# Patient Record
Sex: FEMALE | Race: WHITE | NOT HISPANIC OR LATINO | URBAN - METROPOLITAN AREA
[De-identification: names, ages, dates, MRNs, and addresses within clinical notes are randomized per-mention and may not be internally consistent; named-entity substitution may affect disease eponyms.]

---

## 2024-06-08 ENCOUNTER — HOSPITAL ENCOUNTER (EMERGENCY)
Facility: HOSPITAL | Age: 64
Discharge: HOME | End: 2024-06-08
Payer: MEDICARE

## 2024-06-08 VITALS
OXYGEN SATURATION: 94 % | TEMPERATURE: 98.2 F | SYSTOLIC BLOOD PRESSURE: 130 MMHG | WEIGHT: 190 LBS | RESPIRATION RATE: 16 BRPM | HEART RATE: 92 BPM | BODY MASS INDEX: 30.53 KG/M2 | HEIGHT: 66 IN | DIASTOLIC BLOOD PRESSURE: 87 MMHG

## 2024-06-08 DIAGNOSIS — S09.90XA CLOSED HEAD INJURY, INITIAL ENCOUNTER: Primary | ICD-10-CM

## 2024-06-08 PROCEDURE — 99281 EMR DPT VST MAYX REQ PHY/QHP: CPT

## 2024-06-08 ASSESSMENT — PAIN SCALES - GENERAL: PAINLEVEL_OUTOF10: 3

## 2024-06-08 ASSESSMENT — PAIN - FUNCTIONAL ASSESSMENT: PAIN_FUNCTIONAL_ASSESSMENT: 0-10

## 2024-06-08 ASSESSMENT — PAIN DESCRIPTION - LOCATION: LOCATION: HEAD

## 2024-06-08 ASSESSMENT — PAIN DESCRIPTION - ORIENTATION: ORIENTATION: LEFT

## 2024-06-08 ASSESSMENT — PAIN DESCRIPTION - DESCRIPTORS: DESCRIPTORS: SORE

## 2024-06-08 ASSESSMENT — COLUMBIA-SUICIDE SEVERITY RATING SCALE - C-SSRS
6. HAVE YOU EVER DONE ANYTHING, STARTED TO DO ANYTHING, OR PREPARED TO DO ANYTHING TO END YOUR LIFE?: NO
2. HAVE YOU ACTUALLY HAD ANY THOUGHTS OF KILLING YOURSELF?: NO
1. IN THE PAST MONTH, HAVE YOU WISHED YOU WERE DEAD OR WISHED YOU COULD GO TO SLEEP AND NOT WAKE UP?: NO

## 2024-06-08 ASSESSMENT — LIFESTYLE VARIABLES
EVER FELT BAD OR GUILTY ABOUT YOUR DRINKING: NO
EVER HAD A DRINK FIRST THING IN THE MORNING TO STEADY YOUR NERVES TO GET RID OF A HANGOVER: NO
TOTAL SCORE: 0
HAVE PEOPLE ANNOYED YOU BY CRITICIZING YOUR DRINKING: NO
HAVE YOU EVER FELT YOU SHOULD CUT DOWN ON YOUR DRINKING: NO

## 2024-06-08 ASSESSMENT — PAIN DESCRIPTION - PAIN TYPE: TYPE: ACUTE PAIN

## 2024-06-08 NOTE — ED PROVIDER NOTES
HPI   Chief Complaint   Patient presents with    Head Injury    Leg Pain       Patient is a 63-year-old female presenting via EMS with a head injury.  Per patient, she was in the [facility] waiting room accidentally hit her head on the door frame.  States that she wanted to be evaluated for possible subdural hematoma.  While she was being triaged, she became very irate and causing a ruckus.  I went and personally evaluate this patient and she appeared very frantic and states she would like to leave.  Patient states she is not taking blood thinners at this time.  Patient denies fevers, chills, cough, sore throat, runny nose, chest pain, shortness of breath, abdominal pain, nausea, vomiting, diarrhea or urinary complaints.                          [place] Coma Scale Score: 15                     Patient History   Past Medical History:   Diagnosis Date    Personal history of other infectious and parasitic diseases 11/25/2015    History of hepatitis C virus infection     No past surgical history on file.  No family history on file.  Social History     Tobacco Use    Smoking status: Not on file    Smokeless tobacco: Not on file   Substance Use Topics    Alcohol use: Not on file    Drug use: Not on file       Physical Exam   ED Triage Vitals [06/08/24 1547]   Temperature Heart Rate Respirations BP   36.8 °C (98.2 °F) (!) 105 18 130/87      Pulse Ox Temp Source Heart Rate Source Patient Position   94 % Temporal Monitor Sitting      BP Location FiO2 (%)     Right arm --       Physical Exam  Constitutional:       Comments: Awake, frantic appearing, pacing about the triage room   HENT:      Head:      Comments: Patient unwilling to let me evaluate her     Nose:      Comments: Patient unwilling to let me evaluate her     Mouth/Throat:      Comments: Patient unwilling to let me evaluate her  Eyes:      Comments: Patient unwilling to let me evaluate her   Neck:      Comments: Patient unwilling to let me evaluate  "her  Cardiovascular:      Comments: Patient unwilling to let me evaluate her  Pulmonary:      Comments: Patient unwilling to let me evaluate her.  Patient speaking in full sentences  Abdominal:      Comments: Patient unwilling to let me evaluate her   Musculoskeletal:      Comments: Patient unwilling to let me evaluate her.  Patient actively pacing about the ER triage room   Skin:     Comments: Patient unwilling to let me evaluate her   Neurological:      General: No focal deficit present.      Comments: Patient unwilling to let me evaluate her   Psychiatric:      Comments: cecilia Nicole, flight of ideas         ED Course & MDM   Diagnoses as of 06/08/24 1616   Closed head injury, initial encounter       Medical Decision Making  Patient is a 63-year-old female presenting via EMS with a head injury.  Patient was adamant that I did not step across the line into the triage room as \"that will cost me money\".  I urged her that since she was here, I would like to do an exam on her and we can get a CT scan.  States that she is adamantly denying CAT scan because she again does not want to be evaluated.  She just wants her son to come and pick her up.  Security was present in case patient became more irate.    Patient subsequently made a phone call and then has since left the emergency department.  Patient has left with treatment and complete.  Attending physician was available for consultation on this patient.        Procedure  Procedures     Get Irby PA-C  06/08/24 1730    "
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
## 2024-10-24 ENCOUNTER — HOSPITAL ENCOUNTER (EMERGENCY)
Facility: HOSPITAL | Age: 64
Discharge: HOME | End: 2024-10-25
Attending: EMERGENCY MEDICINE
Payer: MEDICARE

## 2024-10-24 DIAGNOSIS — R10.84 GENERALIZED ABDOMINAL PAIN: Primary | ICD-10-CM

## 2024-10-24 LAB
APPEARANCE UR: CLEAR
BACTERIA #/AREA URNS AUTO: ABNORMAL /HPF
BASOPHILS # BLD AUTO: 0.04 X10*3/UL (ref 0–0.1)
BASOPHILS NFR BLD AUTO: 0.6 %
BILIRUB UR STRIP.AUTO-MCNC: NEGATIVE MG/DL
COLOR UR: ABNORMAL
EOSINOPHIL # BLD AUTO: 0.21 X10*3/UL (ref 0–0.7)
EOSINOPHIL NFR BLD AUTO: 3.1 %
ERYTHROCYTE [DISTWIDTH] IN BLOOD BY AUTOMATED COUNT: 13.3 % (ref 11.5–14.5)
GLUCOSE UR STRIP.AUTO-MCNC: NORMAL MG/DL
HCT VFR BLD AUTO: 44.5 % (ref 36–46)
HGB BLD-MCNC: 15.4 G/DL (ref 12–16)
HYALINE CASTS #/AREA URNS AUTO: ABNORMAL /LPF
IMM GRANULOCYTES # BLD AUTO: 0.03 X10*3/UL (ref 0–0.7)
IMM GRANULOCYTES NFR BLD AUTO: 0.4 % (ref 0–0.9)
KETONES UR STRIP.AUTO-MCNC: NEGATIVE MG/DL
LEUKOCYTE ESTERASE UR QL STRIP.AUTO: ABNORMAL
LYMPHOCYTES # BLD AUTO: 1.66 X10*3/UL (ref 1.2–4.8)
LYMPHOCYTES NFR BLD AUTO: 24.4 %
MCH RBC QN AUTO: 29.9 PG (ref 26–34)
MCHC RBC AUTO-ENTMCNC: 34.6 G/DL (ref 32–36)
MCV RBC AUTO: 86 FL (ref 80–100)
MONOCYTES # BLD AUTO: 0.69 X10*3/UL (ref 0.1–1)
MONOCYTES NFR BLD AUTO: 10.1 %
MUCOUS THREADS #/AREA URNS AUTO: ABNORMAL /LPF
NEUTROPHILS # BLD AUTO: 4.17 X10*3/UL (ref 1.2–7.7)
NEUTROPHILS NFR BLD AUTO: 61.4 %
NITRITE UR QL STRIP.AUTO: NEGATIVE
NRBC BLD-RTO: 0 /100 WBCS (ref 0–0)
PH UR STRIP.AUTO: 5 [PH]
PLATELET # BLD AUTO: 283 X10*3/UL (ref 150–450)
PROT UR STRIP.AUTO-MCNC: NEGATIVE MG/DL
RBC # BLD AUTO: 5.15 X10*6/UL (ref 4–5.2)
RBC # UR STRIP.AUTO: NEGATIVE /UL
RBC #/AREA URNS AUTO: ABNORMAL /HPF
SP GR UR STRIP.AUTO: 1.02
SQUAMOUS #/AREA URNS AUTO: ABNORMAL /HPF
UROBILINOGEN UR STRIP.AUTO-MCNC: NORMAL MG/DL
WBC # BLD AUTO: 6.8 X10*3/UL (ref 4.4–11.3)
WBC #/AREA URNS AUTO: ABNORMAL /HPF

## 2024-10-24 PROCEDURE — 36415 COLL VENOUS BLD VENIPUNCTURE: CPT

## 2024-10-24 PROCEDURE — 83690 ASSAY OF LIPASE: CPT

## 2024-10-24 PROCEDURE — 83605 ASSAY OF LACTIC ACID: CPT

## 2024-10-24 PROCEDURE — 85025 COMPLETE CBC W/AUTO DIFF WBC: CPT

## 2024-10-24 PROCEDURE — 2500000004 HC RX 250 GENERAL PHARMACY W/ HCPCS (ALT 636 FOR OP/ED)

## 2024-10-24 PROCEDURE — 84075 ASSAY ALKALINE PHOSPHATASE: CPT

## 2024-10-24 PROCEDURE — 96374 THER/PROPH/DIAG INJ IV PUSH: CPT

## 2024-10-24 PROCEDURE — 81001 URINALYSIS AUTO W/SCOPE: CPT

## 2024-10-24 PROCEDURE — 2500000001 HC RX 250 WO HCPCS SELF ADMINISTERED DRUGS (ALT 637 FOR MEDICARE OP)

## 2024-10-24 PROCEDURE — 96361 HYDRATE IV INFUSION ADD-ON: CPT

## 2024-10-24 PROCEDURE — 87086 URINE CULTURE/COLONY COUNT: CPT | Mod: WESLAB

## 2024-10-24 PROCEDURE — 99284 EMERGENCY DEPT VISIT MOD MDM: CPT | Mod: 25

## 2024-10-24 RX ORDER — ONDANSETRON HYDROCHLORIDE 2 MG/ML
4 INJECTION, SOLUTION INTRAVENOUS ONCE
Status: COMPLETED | OUTPATIENT
Start: 2024-10-24 | End: 2024-10-24

## 2024-10-24 RX ORDER — ACETAMINOPHEN 325 MG/1
975 TABLET ORAL ONCE
Status: COMPLETED | OUTPATIENT
Start: 2024-10-24 | End: 2024-10-24

## 2024-10-24 ASSESSMENT — COLUMBIA-SUICIDE SEVERITY RATING SCALE - C-SSRS
2. HAVE YOU ACTUALLY HAD ANY THOUGHTS OF KILLING YOURSELF?: NO
1. IN THE PAST MONTH, HAVE YOU WISHED YOU WERE DEAD OR WISHED YOU COULD GO TO SLEEP AND NOT WAKE UP?: NO
6. HAVE YOU EVER DONE ANYTHING, STARTED TO DO ANYTHING, OR PREPARED TO DO ANYTHING TO END YOUR LIFE?: NO

## 2024-10-24 ASSESSMENT — PAIN DESCRIPTION - DESCRIPTORS: DESCRIPTORS: ACHING;SHARP;SHOOTING

## 2024-10-25 ENCOUNTER — APPOINTMENT (OUTPATIENT)
Dept: RADIOLOGY | Facility: HOSPITAL | Age: 64
End: 2024-10-25
Payer: MEDICARE

## 2024-10-25 VITALS
TEMPERATURE: 97.7 F | WEIGHT: 215.17 LBS | BODY MASS INDEX: 34.58 KG/M2 | HEART RATE: 86 BPM | SYSTOLIC BLOOD PRESSURE: 130 MMHG | RESPIRATION RATE: 16 BRPM | DIASTOLIC BLOOD PRESSURE: 89 MMHG | HEIGHT: 66 IN | OXYGEN SATURATION: 97 %

## 2024-10-25 PROBLEM — R10.84 GENERALIZED ABDOMINAL PAIN: Status: ACTIVE | Noted: 2024-10-25

## 2024-10-25 LAB
ALBUMIN SERPL BCP-MCNC: 4 G/DL (ref 3.4–5)
ALP SERPL-CCNC: 70 U/L (ref 33–136)
ALT SERPL W P-5'-P-CCNC: 14 U/L (ref 7–45)
ANION GAP SERPL CALCULATED.3IONS-SCNC: 13 MMOL/L (ref 10–20)
AST SERPL W P-5'-P-CCNC: 15 U/L (ref 9–39)
BILIRUB SERPL-MCNC: 0.4 MG/DL (ref 0–1.2)
BUN SERPL-MCNC: 17 MG/DL (ref 6–23)
CALCIUM SERPL-MCNC: 9.4 MG/DL (ref 8.6–10.3)
CHLORIDE SERPL-SCNC: 103 MMOL/L (ref 98–107)
CO2 SERPL-SCNC: 26 MMOL/L (ref 21–32)
CREAT SERPL-MCNC: 0.78 MG/DL (ref 0.5–1.05)
EGFRCR SERPLBLD CKD-EPI 2021: 85 ML/MIN/1.73M*2
GLUCOSE SERPL-MCNC: 85 MG/DL (ref 74–99)
HOLD SPECIMEN: NORMAL
LACTATE SERPL-SCNC: 0.8 MMOL/L (ref 0.4–2)
LIPASE SERPL-CCNC: 7 U/L (ref 9–82)
POTASSIUM SERPL-SCNC: 4 MMOL/L (ref 3.5–5.3)
PROT SERPL-MCNC: 7.1 G/DL (ref 6.4–8.2)
SODIUM SERPL-SCNC: 138 MMOL/L (ref 136–145)

## 2024-10-25 PROCEDURE — 2500000004 HC RX 250 GENERAL PHARMACY W/ HCPCS (ALT 636 FOR OP/ED): Performed by: EMERGENCY MEDICINE

## 2024-10-25 PROCEDURE — 96376 TX/PRO/DX INJ SAME DRUG ADON: CPT

## 2024-10-25 PROCEDURE — 2500000001 HC RX 250 WO HCPCS SELF ADMINISTERED DRUGS (ALT 637 FOR MEDICARE OP): Performed by: EMERGENCY MEDICINE

## 2024-10-25 PROCEDURE — 74176 CT ABD & PELVIS W/O CONTRAST: CPT

## 2024-10-25 PROCEDURE — 74176 CT ABD & PELVIS W/O CONTRAST: CPT | Performed by: RADIOLOGY

## 2024-10-25 RX ORDER — ONDANSETRON HYDROCHLORIDE 2 MG/ML
4 INJECTION, SOLUTION INTRAVENOUS ONCE
Status: COMPLETED | OUTPATIENT
Start: 2024-10-25 | End: 2024-10-25

## 2024-10-25 RX ORDER — OXYCODONE AND ACETAMINOPHEN 5; 325 MG/1; MG/1
1 TABLET ORAL ONCE
Status: COMPLETED | OUTPATIENT
Start: 2024-10-25 | End: 2024-10-25

## 2024-10-25 NOTE — DISCHARGE INSTRUCTIONS
Thank you for choosing [facility] Emergency Department. It was my pleasure to be involved in your care today.         As of today's visit, based on reasonable likelihood, that it is safe for you to be discharged back to your residence to follow-up as an outpatient for ongoing management of your medical problem. You should follow-up with any referrals / primary provider as soon as possible. The contacts (number, addresses) are listed below.         Important:  Even though we think it is safe for you to go home, there is always a small chance that we are missing something that could require hospitalization.  Therefore it is very important that if you get worse or develops any new symptoms that you return here as soon as possible to be re-evaluated.  This includes return of symptoms that have resolved such as fainting, chest pain, or symptoms that could be warning signs for stroke important:  Even though we think it is safe for you to go home, there is always a small chance that we are missing something that could require hospitalization.  Therefore it is very important that if you get worse or develops any new symptoms that you return here as soon as possible to be re-evaluated.  This includes return of symptoms that have resolved such as fainting, chest pain, or symptoms that could be warning signs for stroke         Make sure your pharmacy and primary doctor is aware of any new medications prescribed today.          It is your responsibility to contact as soon as possible, and follow through with, any referrals you were given today. We do recommend you inform them you are a [facility] follow-up patient, as often they can better accommodate your need to be seen, provided their schedules allow. We will, and have, made every effort to ensure you have access to adequate follow-up specialists available.          All problems may not be able to be fixed in one ER visit. This is why timely ongoing care is important, and this  is a responsibility you share in. Further, you are free to follow up with any provider you choose, and this is not limited to our suggestion.          If cultures were obtained today, you will be contacted should anything result that would require further treatment. Please contact the ED at the number provided with questions.          Having trouble affording medications? Try [email or web address]! (This is not a hospital endorsed website, merely a recommendation based on my own personal experiences with [facility])

## 2024-10-25 NOTE — ED TRIAGE NOTES
Pt arrives via EMS today by recommendation of her drAngelica de la rosa she had a endoscopic biopsy today at 11am and has been reporting 8-9/10 pain, midsternal, epigastric, upper abdomen pain since the procedure. Pt states she experienced vomiting today 8 times due to the pain. Pt reports a hx of hiatal hernia and a twisted esophagus. Pt reports that laying flat and on her side intensifies the pain.

## 2024-10-25 NOTE — ED PROVIDER NOTES
HPI   Chief Complaint   Patient presents with    Abdominal Pain     Post endoscopy biopsy procedure today at 11am       HPI  Patient is a 63-year-old female who presents to ED for chief complaint of nausea and vomiting and epigastric pain.  She states she had a endoscopy today and was discharged.  After she went home she began to experience some nausea and vomiting.  Patient states she has pain because she is throwing up forcefully.  She denies any chest pain or shortness of breath, fever chills, headache or dizziness, cough or congestion, urinary symptoms.  Denies any recent hospitalizations.  Denies any recent travel or sick contacts.      Patient History   Past Medical History:   Diagnosis Date    Personal history of other infectious and parasitic diseases 11/25/2015    History of hepatitis C virus infection     No past surgical history on file.  No family history on file.  Social History     Tobacco Use    Smoking status: Not on file    Smokeless tobacco: Not on file   Substance Use Topics    Alcohol use: Not on file    Drug use: Not on file       Physical Exam   ED Triage Vitals [10/24/24 2209]   Temperature Heart Rate Respirations BP   36.5 °C (97.7 °F) 94 16 128/90      Pulse Ox Temp Source Heart Rate Source Patient Position   95 % Oral Monitor --      BP Location FiO2 (%)     -- --       Physical Exam  Vitals reviewed.   Constitutional:       General: She is not in acute distress.     Appearance: Normal appearance. She is not ill-appearing.   HENT:      Head: Normocephalic and atraumatic.   Eyes:      Extraocular Movements: Extraocular movements intact.   Cardiovascular:      Rate and Rhythm: Normal rate and regular rhythm.      Heart sounds: Normal heart sounds.   Pulmonary:      Effort: Pulmonary effort is normal.      Breath sounds: Normal breath sounds.   Abdominal:      General: Abdomen is flat.      Palpations: Abdomen is soft.      Tenderness: There is no abdominal tenderness.   Musculoskeletal:          General: Normal range of motion.      Cervical back: Normal range of motion and neck supple.   Skin:     General: Skin is warm and dry.   Neurological:      General: No focal deficit present.      Mental Status: She is alert and oriented to person, place, and time.   Psychiatric:         Mood and Affect: Mood normal.         Behavior: Behavior normal.           ED Course & MDM   Diagnoses as of 10/25/24 1458   Generalized abdominal pain                 No data recorded     [place] Coma Scale Score: 15 (10/24/24 2217 : Zeina Aranda RN)                           Medical Decision Making  Parts of this chart have been completed using voice recognition software. Please excuse any errors of transcription.  My thought process and reason for plan has been formulated from the time that I saw the patient until the time of disposition and is not specific to one specific moment during their visit and furthermore my MDM encompasses this entire chart and not only this text box.    HPI:   A medically appropriate HPI was obtained, outlined above.    Miriam Brower is a  63 y.o. female    Chief Complaint   Patient presents with    Abdominal Pain     Post endoscopy biopsy procedure today at 11am       Past Medical History:   Diagnosis Date    Personal history of other infectious and parasitic diseases 11/25/2015    History of hepatitis C virus infection       No past surgical history on file.         No family history on file.    Allergies   Allergen Reactions    Ketorolac Other     Other Reaction(s): Other: See Comments      Had rectal or urinal bleeding after shot.    Had rectal or urinal bleeding after shot.    Buspirone Other     Other Reaction(s): Unknown       No current outpatient medications    History obtained from:   Patient, EMR    Exam:   Patient Vitals for the past 24 hrs:   BP Temp Temp src Pulse Resp SpO2 Height Weight   10/25/24 0324 130/89 -- -- 86 16 97 % -- --   10/24/24 2209 128/90 36.5 °C (97.7 °F) Oral  "94 16 95 % 1.676 m (5' 6\") 97.6 kg (215 lb 2.7 oz)       A medically appropriate exam performed, outlined above, given the known history and presentation.    EKG/Cardiac monitor:     Social Determinants of Health considered during this visit:  Housing    Medications given during visit:  Medications   ondansetron (Zofran) injection 4 mg (4 mg intravenous Given 10/24/24 2234)   sodium chloride 0.9 % bolus 1,000 mL (0 mL intravenous Stopped 10/25/24 0317)   acetaminophen (Tylenol) tablet 975 mg (975 mg oral Given 10/24/24 2307)   oxyCODONE-acetaminophen (Percocet) 5-325 mg per tablet 1 tablet (1 tablet oral Given 10/25/24 0144)   ondansetron (Zofran) injection 4 mg (4 mg intravenous Given 10/25/24 0144)        Diagnostic/tests:  Labs Reviewed   LIPASE - Abnormal       Result Value    Lipase 7 (*)     Narrative:     Venipuncture immediately after or during the administration of Metamizole may lead to falsely low results. Testing should be performed immediately prior to Metamizole dosing.   URINALYSIS WITH REFLEX CULTURE AND MICROSCOPIC - Abnormal    Color, Urine Light-Yellow      Appearance, Urine Clear      Specific Gravity, Urine 1.020      pH, Urine 5.0      Protein, Urine NEGATIVE      Glucose, Urine Normal      Blood, Urine NEGATIVE      Ketones, Urine NEGATIVE      Bilirubin, Urine NEGATIVE      Urobilinogen, Urine Normal      Nitrite, Urine NEGATIVE      Leukocyte Esterase, Urine 75 Sammy/µL (*)    MICROSCOPIC ONLY, URINE - Abnormal    WBC, Urine 6-10 (*)     RBC, Urine 1-2      Squamous Epithelial Cells, Urine 1-9 (SPARSE)      Bacteria, Urine 1+ (*)     Mucus, Urine 1+      Hyaline Casts, Urine 2+ (*)    COMPREHENSIVE METABOLIC PANEL - Normal    Glucose 85      Sodium 138      Potassium 4.0      Chloride 103      Bicarbonate 26      Anion Gap 13      Urea Nitrogen 17      Creatinine 0.78      eGFR 85      Calcium 9.4      Albumin 4.0      Alkaline Phosphatase 70      Total Protein 7.1      AST 15      Bilirubin, " Total 0.4      ALT 14     LACTATE - Normal    Lactate 0.8      Narrative:     Venipuncture immediately after or during the administration of Metamizole may lead to falsely low results. Testing should be performed immediately prior to Metamizole dosing.   URINE CULTURE   CBC WITH AUTO DIFFERENTIAL    WBC 6.8      nRBC 0.0      RBC 5.15      Hemoglobin 15.4      Hematocrit 44.5      MCV 86      MCH 29.9      MCHC 34.6      RDW 13.3      Platelets 283      Neutrophils % 61.4      Immature Granulocytes %, Automated 0.4      Lymphocytes % 24.4      Monocytes % 10.1      Eosinophils % 3.1      Basophils % 0.6      Neutrophils Absolute 4.17      Immature Granulocytes Absolute, Automated 0.03      Lymphocytes Absolute 1.66      Monocytes Absolute 0.69      Eosinophils Absolute 0.21      Basophils Absolute 0.04     URINALYSIS WITH REFLEX CULTURE AND MICROSCOPIC    Narrative:     The following orders were created for panel order Urinalysis with Reflex Culture and Microscopic.  Procedure                               Abnormality         Status                     ---------                               -----------         ------                     Urinalysis with Reflex C...[[id number]]  Abnormal            Final result               Extra Urine Gray Tube[[id number]]                            Final result                 Please view results for these tests on the individual orders.   EXTRA URINE GRAY TUBE    Extra Tube Hold for add-ons.          CT abdomen pelvis wo IV contrast   Final Result   1. No acute abdominal or pelvic process.   2. Small fat containing periumbilical hernia. No evidence of focal   inflammation to suggest strangulation.   3. Patchy lingular airspace opacities, possibly   infectious/inflammatory.   4. Nonobstructing renal calculi measuring up to 0.7 cm.   5. Moderate-sized hiatal hernia.   6. Colonic diverticulosis without evidence of diverticulitis.        Signed by: Shaquille Hurst 10/25/2024 1:46  AM   Dictation workstation:   [id number]           Differential:  As I have deemed necessary from their history, physical and studies, I have considered the following diagnoses:     ABDOMINAL AORTIC ANEURYSM  ACUTE APPENDICITIS  BOWEL OBSTRUCTION  CHOLECYSTITIS  DESCENDING AORTIC DISSECTION  DIVERTICULITIS  INTRABDOMINAL ABSCESS  INCARCERATED/STRANGULATED HERNIA  INTERNAL BLEEDING  MESENTERIC ISCHEMIA  PANCREATITIS  PERFORATED BOWEL or ULCER  RUPTURED OVARIAN CYST  SPLENIC INJURY  SPONTANEOUS BACTERIAL PERITONITIS (SBP)  TOXIC MEGACOLON  TOXIC SHOCK SYNDROME  URINARY SEPSIS    LOW RISK ABDOMINAL PAIN:  I completed a structured, evidence-based clinical evaluation and testing for abdominal pain. The workup included a CT unless the patient met a set of criteria or the patient declined the CT. The evidence indicates that the patient is very low risk for any acute abdominal emergency, and this is consistent with my clinical intuition. The risk of further imaging or hospitalization is likely higher than the risk of the patient having an acute emergent condition related to today´s symptoms. It is, therefore, in the patient´s best interest not to do additional emergent testing or be hospitalized. I have discussed with the patient my clinical impression and the result of an evidence-based clinical evaluation to screen for an acute abdominal emergency, as well as the risk of further testing and hospitalization. The evidence shows that the risk for an acute condition related to today´s symptoms and signs is extremely low. Although the risk of progression or new symptoms cannot be excluded, the risks of further testing or hospitalization likely exceed the benefit, and the patient declines further emergent evaluation or hospitalization for evaluation of the abdominal pain.    Critical Care:  Not indicated    MDM Summary:  Lab studies and imaging are unremarkable today.  Patient reports significant improvement of symptoms after  medications.  She is safe for discharge.    We have discussed the diagnosis and risks, and we agree with discharging home to follow-up with appropriate physician as directed. We also discussed returning to the Emergency Department immediately if new or worsening symptoms occur. We have discussed the symptoms which are most concerning that necessitate immediate return. Pt symptoms have been well controlled here and the patient is safe for discharge with appropriate outpatient follow up. The patient has verbalized understanding to return to ER without delay for new or worsening pains or for any other symptoms or concerns. I utilized the discharge clinical management tool provided [employer] to help estimate risk of negative outcome for this patient.      Disposition:  ED Prescriptions    None           Procedure  Procedures     Gerard Guevara PA-C  10/25/24 4268

## 2024-10-26 LAB — BACTERIA UR CULT: NORMAL

## 2025-01-13 ENCOUNTER — OFFICE VISIT (OUTPATIENT)
Dept: URGENT CARE | Age: 65
End: 2025-01-13
Payer: COMMERCIAL

## 2025-01-13 VITALS
OXYGEN SATURATION: 97 % | BODY MASS INDEX: 27.32 KG/M2 | DIASTOLIC BLOOD PRESSURE: 77 MMHG | TEMPERATURE: 97.1 F | HEART RATE: 117 BPM | RESPIRATION RATE: 18 BRPM | SYSTOLIC BLOOD PRESSURE: 143 MMHG | HEIGHT: 66 IN | WEIGHT: 170 LBS

## 2025-01-13 DIAGNOSIS — R21 RASH: ICD-10-CM

## 2025-01-13 DIAGNOSIS — B37.89 CANDIDIASIS OF BREAST: Primary | ICD-10-CM

## 2025-01-13 PROCEDURE — 99204 OFFICE O/P NEW MOD 45 MIN: CPT | Performed by: PHYSICIAN ASSISTANT

## 2025-01-13 PROCEDURE — 3008F BODY MASS INDEX DOCD: CPT | Performed by: PHYSICIAN ASSISTANT

## 2025-01-13 RX ORDER — NYSTATIN 100000 [USP'U]/G
1 POWDER TOPICAL 2 TIMES DAILY
Qty: 60 G | Refills: 0 | Status: SHIPPED | OUTPATIENT
Start: 2025-01-13 | End: 2026-01-13

## 2025-01-13 NOTE — PROGRESS NOTES
"Subjective   Patient ID: Miriam Brower is a 64 y.o. female. They present today with a chief complaint of Rash (Under breast ).  Patient reports rash present for the past several days.  Reports foul odor and redness.  Never happened previously.  Past Medical History  Allergies as of 01/13/2025 - Reviewed 10/24/2024   Allergen Reaction Noted    Ketorolac Other 10/15/2020    Buspirone Other 03/28/2024       (Not in a hospital admission)       Past Medical History:   Diagnosis Date    Personal history of other infectious and parasitic diseases 11/25/2015    History of hepatitis C virus infection       No past surgical history on file.         Review of Systems  Review of Systems   Skin:  Positive for rash.                                  Objective    Vitals:    01/13/25 1629   BP: 143/77   Pulse: (!) 117   Resp: 18   Temp: 36.2 °C (97.1 °F)   TempSrc: Temporal   SpO2: 97%   Weight: 77.1 kg (170 lb)   Height: 1.676 m (5' 6\")     No LMP recorded.    Physical Exam  Vitals and nursing note reviewed. Exam conducted with a chaperone present.   Constitutional:       Appearance: Normal appearance.   Eyes:      Conjunctiva/sclera: Conjunctivae normal.   Cardiovascular:      Rate and Rhythm: Normal rate.   Pulmonary:      Effort: Pulmonary effort is normal.   Skin:     Comments: Macular erythematous rash underneath bilateral breasts.  White discharge.   Neurological:      Mental Status: She is alert.   Psychiatric:         Mood and Affect: Mood normal.         Procedures    Point of Care Test & Imaging Results from this visit  No results found for this visit on 01/13/25.   No results found.    Diagnostic study results (if any) were reviewed by Avinash Funk PA-C.    Assessment/Plan   Allergies, medications, history, and pertinent labs/EKGs/Imaging reviewed by Avinash Funk PA-C.     Medical Decision Making  Findings consistent with candidiasis.  Will give prescription for nystatin.  Education supportive measures.  Has " follow-up appoint with PCP in a few days.  Education on hygiene and supportive measures.    Orders and Diagnoses  There are no diagnoses linked to this encounter.    Medical Admin Record      Patient disposition: Home    Electronically signed by Avinash Funk PA-C  5:26 PM

## 2025-01-13 NOTE — LETTER
January 13, 2025     Patient: Miriam Brower   YOB: 1960   Date of Visit: 1/13/2025       To Whom It May Concern:    Miriam Brower was seen in my clinic on 1/13/2025 at 4:15 pm. Please excuse Miriam for her absence from work on this day to make the appointment.    If you have any questions or concerns, please don't hesitate to call.         Sincerely,         Avinash Funk PA-C        CC: No Recipients